# Patient Record
Sex: FEMALE | Race: WHITE
[De-identification: names, ages, dates, MRNs, and addresses within clinical notes are randomized per-mention and may not be internally consistent; named-entity substitution may affect disease eponyms.]

---

## 2017-11-06 ENCOUNTER — FORM ENCOUNTER (OUTPATIENT)
Age: 72
End: 2017-11-06

## 2019-01-29 ENCOUNTER — FORM ENCOUNTER (OUTPATIENT)
Age: 74
End: 2019-01-29

## 2020-09-25 ENCOUNTER — FORM ENCOUNTER (OUTPATIENT)
Age: 75
End: 2020-09-25

## 2020-10-13 ENCOUNTER — FORM ENCOUNTER (OUTPATIENT)
Age: 75
End: 2020-10-13

## 2020-10-22 ENCOUNTER — FORM ENCOUNTER (OUTPATIENT)
Age: 75
End: 2020-10-22

## 2021-06-30 PROBLEM — Z00.00 ENCOUNTER FOR PREVENTIVE HEALTH EXAMINATION: Status: ACTIVE | Noted: 2021-06-30

## 2021-10-08 ENCOUNTER — NON-APPOINTMENT (OUTPATIENT)
Age: 76
End: 2021-10-08

## 2021-10-08 DIAGNOSIS — R92.2 INCONCLUSIVE MAMMOGRAM: ICD-10-CM

## 2021-10-08 DIAGNOSIS — N60.02 SOLITARY CYST OF LEFT BREAST: ICD-10-CM

## 2021-10-08 DIAGNOSIS — N63.10 UNSPECIFIED LUMP IN THE RIGHT BREAST, UNSPECIFIED QUADRANT: ICD-10-CM

## 2021-10-12 PROBLEM — R92.2 DENSE BREAST TISSUE ON MAMMOGRAM: Status: ACTIVE | Noted: 2021-10-12

## 2021-10-12 PROBLEM — N60.02 CYST OF LEFT BREAST: Status: ACTIVE | Noted: 2021-10-12

## 2021-10-12 PROBLEM — N63.10 BREAST MASS, RIGHT: Status: ACTIVE | Noted: 2021-10-12

## 2021-10-14 ENCOUNTER — APPOINTMENT (OUTPATIENT)
Dept: BREAST CENTER | Facility: CLINIC | Age: 76
End: 2021-10-14
Payer: MEDICARE

## 2021-10-14 VITALS
HEIGHT: 64 IN | DIASTOLIC BLOOD PRESSURE: 83 MMHG | HEART RATE: 83 BPM | WEIGHT: 149 LBS | BODY MASS INDEX: 25.44 KG/M2 | SYSTOLIC BLOOD PRESSURE: 119 MMHG

## 2021-10-14 DIAGNOSIS — Z78.9 OTHER SPECIFIED HEALTH STATUS: ICD-10-CM

## 2021-10-14 DIAGNOSIS — Z80.3 FAMILY HISTORY OF MALIGNANT NEOPLASM OF BREAST: ICD-10-CM

## 2021-10-14 DIAGNOSIS — Z87.39 PERSONAL HISTORY OF OTHER DISEASES OF THE MUSCULOSKELETAL SYSTEM AND CONNECTIVE TISSUE: ICD-10-CM

## 2021-10-14 DIAGNOSIS — Z86.018 PERSONAL HISTORY OF OTHER BENIGN NEOPLASM: ICD-10-CM

## 2021-10-14 DIAGNOSIS — Z86.19 PERSONAL HISTORY OF OTHER INFECTIOUS AND PARASITIC DISEASES: ICD-10-CM

## 2021-10-14 DIAGNOSIS — M70.70 OTHER BURSITIS OF HIP, UNSPECIFIED HIP: ICD-10-CM

## 2021-10-14 PROCEDURE — 99213 OFFICE O/P EST LOW 20 MIN: CPT

## 2021-10-14 RX ORDER — DICLOFENAC SODIUM 300 MG/G
3 CREAM TOPICAL
Refills: 0 | Status: ACTIVE | COMMUNITY

## 2021-10-14 RX ORDER — TRAMADOL HYDROCHLORIDE 25 MG/1
TABLET, COATED ORAL
Refills: 0 | Status: ACTIVE | COMMUNITY

## 2021-10-14 RX ORDER — ACETAMINOPHEN 325 MG/1
TABLET, FILM COATED ORAL
Refills: 0 | Status: ACTIVE | COMMUNITY

## 2021-10-14 RX ORDER — ESTRADIOL 10 UG/1
TABLET, FILM COATED VAGINAL
Refills: 0 | Status: ACTIVE | COMMUNITY

## 2021-10-14 NOTE — PHYSICAL EXAM
[Normocephalic] : normocephalic [Supple] : supple [No Supraclavicular Adenopathy] : no supraclavicular adenopathy [Examined in the supine and seated position] : examined in the supine and seated position [No dominant masses] : no dominant masses in right breast  [No dominant masses] : no dominant masses left breast [No Nipple Retraction] : no left nipple retraction [No Nipple Discharge] : no left nipple discharge [Symmetrical] : symmetrical [No Axillary Lymphadenopathy] : no left axillary lymphadenopathy [No Rashes] : no rashes [No Ulceration] : no ulceration

## 2021-10-18 ENCOUNTER — TRANSCRIPTION ENCOUNTER (OUTPATIENT)
Age: 76
End: 2021-10-18

## 2021-10-22 NOTE — PAST MEDICAL HISTORY
[Postmenopausal] : The patient is postmenopausal [Menarche Age ____] : age at menarche was [unfilled] [Menopause Age____] : age at menopause was [unfilled] [Total Preg ___] : G[unfilled] [Abortions ___] : Abortions:[unfilled] [FreeTextEntry6] : NO [FreeTextEntry5] : MYOMECTOMY 1984 [FreeTextEntry7] : NO [FreeTextEntry8] : NO

## 2021-10-22 NOTE — DATA REVIEWED
[FreeTextEntry1] : 10/6/2021 ([facility]) bilateral screening mammogram/US showing breasts are heterogeneously dense, No suspicious masses, architectural distortion, or significant calcifications are detected. There is a stable mass in the upper outer right breast. Scattered benign-appearing calcifications are seen in the right breast. Stable masses are seen in the left breast. Scattered benign-appearing calcifications are seen in the left breast. A biopsy clip is seen in the left breast. Right breast, 11:00, 3 cm from the nipple, stable 1.1 x 0.5 x 1.0 cm mass Left breast, 12:00, retroareolar, stable 1.2 x 0.7 x 1.2 cm benign-appearing cyst\par Left breast, 2:00, 3 cm from the nipple, 0.4 x 0.2 x 0.2 cm benign-appearing stable cyst\par Left breast, 6:00, 1 cm from the nipple, stable benign-appearing 0.9 x 0.5 x 0.7 cm cluster of cysts. Benign BIRADS 2 \par

## 2021-10-22 NOTE — HISTORY OF PRESENT ILLNESS
[FreeTextEntry1] : 76 year old female patient presents for breast cancer screening. LOV was with Dr Kevin Boyd on 10/23/2020. She was previously under the care of Dr. Angel Doherty, since 2009, prior to that was seen at [facility].  Patient reports hx benign breast biopsies, last one was at [facility] in May 2009.\par \par She has a family history of breast cancer, her sister was diagnosed at age 56. She is not aware of the status of her sister at this time. As far as she knows her sister did not have genetic testing. Pt has not had genetic testing.\par \par QAMAR lifetime risk of 9.5%.\par \par Denies any palpable abnormalities, any skin changes/dimpling, or any nipple discharge bilaterally.\par \par

## 2021-10-22 NOTE — ASSESSMENT
[FreeTextEntry1] : 76 year old female patient presents for breast cancer screening. LOV was with Dr Kevin Boyd on 10/23/2020. She was previously under the care of Dr. Angel Doherty, since 2009, prior to that was seen at [facility].  Patient reports hx benign breast biopsies, last one was at [facility] in May 2009.\par \par She has a family history of breast cancer, her sister was diagnosed at age 56. She is not aware of the status of her sister at this time. As far as she knows her sister did not have genetic testing. Pt has not had genetic testing.\par \par QAMAR lifetime risk of 9.5%.\par \par Denies any palpable abnormalities, any skin changes/dimpling, or any nipple discharge bilaterally.\par \par Patient will f/up in 12 months for [facility] mammo/ breast US and re-examination.

## 2021-11-15 ENCOUNTER — TRANSCRIPTION ENCOUNTER (OUTPATIENT)
Age: 76
End: 2021-11-15